# Patient Record
Sex: FEMALE | Race: WHITE | Employment: PART TIME | URBAN - METROPOLITAN AREA
[De-identification: names, ages, dates, MRNs, and addresses within clinical notes are randomized per-mention and may not be internally consistent; named-entity substitution may affect disease eponyms.]

---

## 2022-05-23 RX ORDER — NORETHINDRONE ACETATE AND ETHINYL ESTRADIOL, AND FERROUS FUMARATE 1MG-20(24)
KIT ORAL
Qty: 84 TABLET | OUTPATIENT
Start: 2022-05-23

## 2022-05-24 RX ORDER — NORETHINDRONE ACETATE AND ETHINYL ESTRADIOL AND FERROUS FUMARATE 1MG-20(24)
1 KIT ORAL DAILY
Qty: 28 TABLET | Refills: 2 | Status: SHIPPED | OUTPATIENT
Start: 2022-05-24 | End: 2022-07-11 | Stop reason: SDUPTHER

## 2022-06-13 RX ORDER — NORETHINDRONE ACETATE AND ETHINYL ESTRADIOL, AND FERROUS FUMARATE 1MG-20(24)
KIT ORAL
Qty: 28 TABLET | Refills: 2 | OUTPATIENT
Start: 2022-06-13

## 2022-07-11 ENCOUNTER — OFFICE VISIT (OUTPATIENT)
Dept: GYNECOLOGY | Age: 27
End: 2022-07-11

## 2022-07-11 VITALS
WEIGHT: 170 LBS | SYSTOLIC BLOOD PRESSURE: 140 MMHG | DIASTOLIC BLOOD PRESSURE: 80 MMHG | HEIGHT: 67 IN | BODY MASS INDEX: 26.68 KG/M2

## 2022-07-11 DIAGNOSIS — R09.89 LABILE HYPERTENSION: ICD-10-CM

## 2022-07-11 DIAGNOSIS — Z12.4 SCREENING FOR MALIGNANT NEOPLASM OF CERVIX: ICD-10-CM

## 2022-07-11 DIAGNOSIS — Z01.419 WELL WOMAN EXAM: ICD-10-CM

## 2022-07-11 DIAGNOSIS — Z30.09 GENERAL COUNSELING FOR PRESCRIPTION OF ORAL CONTRACEPTIVES: Primary | ICD-10-CM

## 2022-07-11 PROCEDURE — 99395 PREV VISIT EST AGE 18-39: CPT | Performed by: OBSTETRICS & GYNECOLOGY

## 2022-07-11 RX ORDER — NORETHINDRONE ACETATE AND ETHINYL ESTRADIOL AND FERROUS FUMARATE 1MG-20(24)
1 KIT ORAL DAILY
Qty: 3 PACKET | Refills: 4 | Status: SHIPPED | OUTPATIENT
Start: 2022-07-11

## 2022-07-11 ASSESSMENT — PATIENT HEALTH QUESTIONNAIRE - PHQ9
SUM OF ALL RESPONSES TO PHQ QUESTIONS 1-9: 0
SUM OF ALL RESPONSES TO PHQ9 QUESTIONS 1 & 2: 0
SUM OF ALL RESPONSES TO PHQ QUESTIONS 1-9: 0
2. FEELING DOWN, DEPRESSED OR HOPELESS: 0
1. LITTLE INTEREST OR PLEASURE IN DOING THINGS: 0

## 2022-07-11 NOTE — PROGRESS NOTES
Sanam Poole  is a 27 y.o. No obstetric history on file. who is here for an annual exam.  She wishes to continue birth control pills. Health Maintenance:    Mammogram  Bone Density  Colonoscopy  Pap Smear 1-2-2019                  History  No flowsheet data found. Past Medical History:   Diagnosis Date    Menorrhagia      Past Surgical History:   Procedure Laterality Date    WISDOM TOOTH EXTRACTION       No current outpatient medications on file prior to visit. No current facility-administered medications on file prior to visit.      Allergies   Allergen Reactions    Latex Itching    Cephalexin Hives     Social History     Tobacco Use    Smoking status: Current Some Day Smoker     Packs/day: 0.25     Types: Cigarettes    Smokeless tobacco: Never Used   Substance Use Topics    Alcohol use: Yes     Comment: occ     Family History   Problem Relation Age of Onset    No Known Problems Mother     Diabetes Maternal Grandfather         35s    Colon Cancer Paternal Grandfather         46s    Ovarian Cancer Neg Hx     Breast Cancer Neg Hx            Review of Systems  All ROS negative except what's noted in HPI    Constitutional:  Denies weight gain, unexplained weight loss or heat or cold intolerance  ENT: Denies change in vision, change in hearing, frequent headaches  Cardiovascular:  Denies chest pain, swelling in legs or feet, shortness of breath when lying flat  Respiratory:  Denies shortness of breath, cough greater than 2 weeks or coughing up blood  Gastro: Denies diarrhea greater than 2 weeks, rectal bleeding, bloody stools, heartburn, or constipation  :  Denies blood in urine, getting up more than twice at night to urinate, dysuria or incontinence  Breast:  Denies nipple discharge, masses or pain  Skin:  Denies rash greater than 2 weeks, change in moles  Musculoskeletal/Neuro:  Denies joint pain, muscle weakness, seizures, loss of balance or frequent falls  Psych:  Denies frequent crying spells or severe anxiety  Heme:  Denies easy bruising, bleeding gums, frequent nosebleeds or swollen lymph nodes  GYN:  Denies bleeding or spotting between menses, heavy menses, menses longer than 7 days, pain with sex, severe menstrual cramps. Social:  Feels safe in her home. Denies ever having been threatened or hit by a family member                  Physical Exam  Blood pressure (!) 140/80, height 5' 6.5\" (1.689 m), weight 170 lb (77.1 kg), last menstrual period 06/02/2022. Body mass index is 27.03 kg/m². No results found for: HGB, HGBP   @LASTPROCAMB([id number])@  @LASTPROCAMB([id number])@    [facility] unremarkable. EOMI. TREVA. Sclera non-icteric. Neck is supple without thyromegaly or nodes. Chest clear to auscultation. Bilateral breath sounds equal.    Heart regular rate and rhythm with no murmur, rub or gallop. Breast exam reveals no masses or nipple discharge. No axillary notes are palpable. Abdomen is benign without organomegally. BUS is normal.    Cervix is  present. Pap smear was performed. Bimanual exam reveals no masses. Assessment  27 y.o. No obstetric history on file. for annual exam.  Encounter Diagnoses   Name Primary?     Well woman exam     Screening for malignant neoplasm of cervix     General counseling for prescription of oral contraceptives Yes    Labile hypertension        Plan  Stephy Man was seen today for gynecologic exam.    Diagnoses and all orders for this visit:    General counseling for prescription of oral contraceptives  -     Norethin Ace-Eth Estrad-FE 1-20 MG-MCG(24) TABS; Take 1 tablet by mouth daily    Well woman exam    Screening for malignant neoplasm of cervix  -     PAP LB, Reflex HPV ASCUS    Labile hypertension        pap smear done  return annually or prn  medications as per orders

## 2022-07-14 LAB
CYTOLOGIST CVX/VAG CYTO: NORMAL
CYTOLOGY CVX/VAG DOC THIN PREP: NORMAL
HPV REFLEX: NORMAL
Lab: NORMAL
PATH REPORT.FINAL DX SPEC: NORMAL
STAT OF ADQ CVX/VAG CYTO-IMP: NORMAL

## 2023-10-04 DIAGNOSIS — Z30.09 GENERAL COUNSELING FOR PRESCRIPTION OF ORAL CONTRACEPTIVES: ICD-10-CM

## 2023-10-04 RX ORDER — NORETHINDRONE ACETATE AND ETHINYL ESTRADIOL, AND FERROUS FUMARATE 1MG-20(24)
1 KIT ORAL DAILY
Qty: 84 TABLET | Refills: 4 | OUTPATIENT
Start: 2023-10-04